# Patient Record
(demographics unavailable — no encounter records)

---

## 2025-04-21 NOTE — HISTORY OF PRESENT ILLNESS
[de-identified] : Patient is here for evaluation of her right neck and shoulder she had injured about 4 years ago in [facility] she is seen another doctor as a Workmen's Comp. case MRI right shoulder shows interstitial tearing MRI of the neck showing some degeneration in the mid cervical spine  On exam she is got some pain with range of motion of her neck to the right trapezial area but good range of motion of the shoulder some mild pain with impingement cuff resistance and Freestone's  Diagnosis cervical radiculopathy right shoulder pain, recommend prescription anti-inflammatory side effects discussed physical therapy for her neck and shoulder we will see her back as needed